# Patient Record
Sex: FEMALE | Race: WHITE | NOT HISPANIC OR LATINO | Employment: FULL TIME | URBAN - METROPOLITAN AREA
[De-identification: names, ages, dates, MRNs, and addresses within clinical notes are randomized per-mention and may not be internally consistent; named-entity substitution may affect disease eponyms.]

---

## 2020-12-03 ENCOUNTER — LAB (OUTPATIENT)
Dept: LAB | Facility: HOSPITAL | Age: 35
End: 2020-12-03

## 2020-12-03 ENCOUNTER — OFFICE VISIT (OUTPATIENT)
Dept: INTERNAL MEDICINE | Facility: CLINIC | Age: 35
End: 2020-12-03

## 2020-12-03 VITALS
RESPIRATION RATE: 16 BRPM | WEIGHT: 134 LBS | OXYGEN SATURATION: 98 % | TEMPERATURE: 97.5 F | DIASTOLIC BLOOD PRESSURE: 68 MMHG | HEIGHT: 63 IN | SYSTOLIC BLOOD PRESSURE: 102 MMHG | BODY MASS INDEX: 23.74 KG/M2 | HEART RATE: 96 BPM

## 2020-12-03 DIAGNOSIS — R53.82 CHRONIC FATIGUE: ICD-10-CM

## 2020-12-03 DIAGNOSIS — F32.A ANXIETY AND DEPRESSION: Primary | ICD-10-CM

## 2020-12-03 DIAGNOSIS — Z11.59 NEED FOR HEPATITIS C SCREENING TEST: ICD-10-CM

## 2020-12-03 DIAGNOSIS — F41.9 ANXIETY AND DEPRESSION: Primary | ICD-10-CM

## 2020-12-03 LAB
BASOPHILS # BLD AUTO: 0.03 10*3/MM3 (ref 0–0.2)
BASOPHILS NFR BLD AUTO: 0.9 % (ref 0–1.5)
DEPRECATED RDW RBC AUTO: 39.5 FL (ref 37–54)
EOSINOPHIL # BLD AUTO: 0.07 10*3/MM3 (ref 0–0.4)
EOSINOPHIL NFR BLD AUTO: 2 % (ref 0.3–6.2)
ERYTHROCYTE [DISTWIDTH] IN BLOOD BY AUTOMATED COUNT: 12.8 % (ref 12.3–15.4)
HCT VFR BLD AUTO: 36.2 % (ref 34–46.6)
HCV AB SER DONR QL: NORMAL
HGB BLD-MCNC: 12.4 G/DL (ref 12–15.9)
IMM GRANULOCYTES # BLD AUTO: 0.01 10*3/MM3 (ref 0–0.05)
IMM GRANULOCYTES NFR BLD AUTO: 0.3 % (ref 0–0.5)
LYMPHOCYTES # BLD AUTO: 0.9 10*3/MM3 (ref 0.7–3.1)
LYMPHOCYTES NFR BLD AUTO: 26.2 % (ref 19.6–45.3)
MCH RBC QN AUTO: 29.6 PG (ref 26.6–33)
MCHC RBC AUTO-ENTMCNC: 34.3 G/DL (ref 31.5–35.7)
MCV RBC AUTO: 86.4 FL (ref 79–97)
MONOCYTES # BLD AUTO: 0.51 10*3/MM3 (ref 0.1–0.9)
MONOCYTES NFR BLD AUTO: 14.9 % (ref 5–12)
NEUTROPHILS NFR BLD AUTO: 1.91 10*3/MM3 (ref 1.7–7)
NEUTROPHILS NFR BLD AUTO: 55.7 % (ref 42.7–76)
NRBC BLD AUTO-RTO: 0 /100 WBC (ref 0–0.2)
PLATELET # BLD AUTO: 290 10*3/MM3 (ref 140–450)
PMV BLD AUTO: 9.3 FL (ref 6–12)
RBC # BLD AUTO: 4.19 10*6/MM3 (ref 3.77–5.28)
WBC # BLD AUTO: 3.43 10*3/MM3 (ref 3.4–10.8)

## 2020-12-03 PROCEDURE — 80053 COMPREHEN METABOLIC PANEL: CPT | Performed by: FAMILY MEDICINE

## 2020-12-03 PROCEDURE — 86803 HEPATITIS C AB TEST: CPT | Performed by: FAMILY MEDICINE

## 2020-12-03 PROCEDURE — 84439 ASSAY OF FREE THYROXINE: CPT

## 2020-12-03 PROCEDURE — 84443 ASSAY THYROID STIM HORMONE: CPT | Performed by: FAMILY MEDICINE

## 2020-12-03 PROCEDURE — 99204 OFFICE O/P NEW MOD 45 MIN: CPT | Performed by: FAMILY MEDICINE

## 2020-12-03 PROCEDURE — 85025 COMPLETE CBC W/AUTO DIFF WBC: CPT | Performed by: FAMILY MEDICINE

## 2020-12-03 RX ORDER — ESCITALOPRAM OXALATE 10 MG/1
10 TABLET ORAL DAILY
Qty: 60 TABLET | Refills: 1 | Status: SHIPPED | OUTPATIENT
Start: 2020-12-03 | End: 2021-01-14 | Stop reason: SDUPTHER

## 2020-12-03 NOTE — PATIENT INSTRUCTIONS
Supporting Someone With Anxiety  Anxiety disorders are mental health conditions that cause overwhelming feelings of nervousness or worry. These feelings interfere with daily activities and relationships. Anxiety disorders include:  · Generalized anxiety disorder (EAGLE).  · Social anxiety.  · Post-traumatic stress disorder (PTSD).  When a person has an anxiety disorder, his or her condition can affect others around him or her, such as friends and family members. Friends and family can help by offering support and understanding.  What do I need to know about this condition?  Anxiety is the mental and physical experience of nervousness or worry that you might feel when you think about a stressful event. Occasional anxiety is normal, but a person with an anxiety disorder becomes preoccupied with this worry. He or she may know that the anxiety is not logical, but knowing this does not relieve the discomfort that he or she feels. Anxiety disorders cause a great deal of distress and prevent someone from having a normal daily life. Someone with an anxiety disorder may:  · Experience anxiety that:  ? May or may not have a specific trigger.  ? Lasts for long periods of time.  ? Causes physical problems over time.  ? Is far more intense than normal anticipation.  ? Occurs at unpredictable times.  · Feel restless or edgy.  · Get fatigued easily.  · Have trouble focusing.  · Have muscle tension.  · Have trouble falling asleep or staying asleep.  · Be irritable and occasionally have sudden expressions of strong feelings (outbursts).  · Have worries that do not make sense to you.  What do I need to know about the treatment options?  Anxiety disorders are generally very treatable by mental health providers such as psychologists, psychiatrists, and clinical social workers. Treatment may include one or more of the following:  · Psychotherapy, also called talk therapy or counseling. Types of psychotherapy that are used to treat anxiety  include:  ? Cognitive behavioral therapy (CBT). This type of therapy teaches a person how to recognize unhealthy feelings, thoughts, and behaviors, and how to replace those feelings with positive thoughts and actions.  ? Behavior therapy that trains a person to relax and self-soothe. This also involves gradually exposing the person to the cause of the anxiety (progressive exposure therapy).  ? Biofeedback. This type of therapy focuses on trying to control certain body functions, like heart rate, to lessen the physical impact of anxiety.  ? Mindfulness-based stress reduction training. This uses education, meditation, and yoga to help a person stay focused on the present instead of living in the past or worrying about the future.  ? Acceptance and commitment therapy (ACT). This helps a person to focus on acceptance, rather than trying to control every situation.  ? Family therapy. This treatment helps family members to communicate and deal with conflict in healthy ways.  · Medicine to treat anxiety and help to control certain emotions and behaviors.  · Mind-body programs. These programs encourage the person with anxiety to be involved in his or her treatment and feel empowered. Mind-body programs may include mindfulness-based stress reduction training, yoga, or vikki chi.  How can I create a safe environment?  · For certain types of anxiety, such as PTSD, you may want to:  ? Remove alcohol and prescription medicines from your loved one's home, or limit the amount of these substances in the home. This can help to prevent your loved one from abusing alcohol and prescription medicines.  ? Remove or lock up guns and other weapons. If you do not have a safe place to keep a gun, local law enforcement may store a gun for you.  · Make a written crisis plan. Include important phone numbers, such as the local crisis intervention team. Make sure that:  ? The person with anxiety knows about this plan and agrees with it.  ? Everyone  "who has regular contact with the person knows about the plan and knows what to do in an emergency.  ? The written plan is easily accessible and can be quickly put into action.  How should I care for myself?  It is important to find ways to care for your body, mind, and well-being while supporting someone with anxiety.  · Try to maintain your normal routines. This can help you remember that your life is about more than your loved one's condition.  · Understand what your limits are. Say \"no\" to requests or events that lead to a schedule that is too busy.  · Make time for activities that help you relax, and try to not feel guilty about taking time for yourself.  · Spend time with friends and family.  · Consider trying meditation and deep breathing exercises to lower your stress. Attend some mind-body classes by yourself or with your loved one.  · Get plenty of sleep.  · Exercise, even if it is just taking a short walk a few times a week.  · If you are struggling emotionally with guilt, fear, or anger, consider working with a therapist.  What are some signs that the condition is getting worse?  Signs that your loved one's condition may be getting worse include:  · Dramatic mood swings.  · Staying away from activities that he or she used to enjoy.  · Drinking more alcohol than normal.  · Either seeming tearful or seeming to lack emotion.  · Talking about \"not feeling right.\"  · Staying away from others (isolating himself or herself).  Where to find support  Talk about the condition  Good communication is the key to supporting your friend or family member. Here are a few things to keep in mind:  · Be careful about too much prodding. Try not to overdo reminders to an adult friend or family member about things like taking medicines. Ask how your loved one prefers that you help.  · Ask questions and then listen to your loved one's response. Be available if your friend or family member wants to talk, but give your loved one " space if he or she does not feel like talking.  · Never ignore comments about suicide, and do not try to avoid the subject of suicide. Talking about suicide will not make your loved one want to act on it. You or your loved one can reach out 24 hours a day to get free, private support (on the phone or a live online chat) from a suicide crisis helpline, such as the National Suicide Prevention Lifeline at 1-938.693.6220.  · Be encouraging and offer emotional support. This can help to lower stress. Even saying something simple to comfort your loved one may help.  · If your loved one is open to it, go with him or her to visits with a counselor or health care provider. Get suggestions directly from your loved one's care providers about when to get help if you are concerned about behavior changes. Privacy laws limit how much a person's health care provider can share with you without your loved one's permission, but if you feel that a situation is an emergency, do not wait to call a health care provider or emergency services.  Find support and resources  · Consider joining self-help and support groups, not only for your friend or family member, but also for yourself. People in these peer and family support groups understand what you and your loved one are going through. They can help you feel a sense of hope and connect you with local resources to help you learn more.  ? You may also consider family therapy.  General support  · Make an effort to learn all you can about your loved one's form of anxiety.  · Include your loved one in activities. Invite him or her to go for walks and outings.  · Help your loved one follow his or her treatment plan as directed by health care providers. This could mean driving him or her to therapy sessions or suggesting ways to cope with stress.  · Remember that your support really matters. Social support is a huge benefit for someone who is coping with anxiety.  Where to find more  information  A health care provider may be able to recommend mental health resources that are available online or over the phone. You could start with:  · Government sites such as the Substance Abuse and Mental Health Services Administration (SAMHSA): www.samhsa.gov  · National mental health organizations such as the National [place] [employer] Mental Illness (CASSIDY): www.cassidy.org  Get help right away if:  · Your loved one expresses thoughts about harming himself or herself or others.  · Your loved one's behavior becomes hard to predict (erratic).  · Your loved one shows behavior that does not make sense with the current time, place, or circumstances. These behaviors may include seeing, feeling, tasting, or hearing things that are not real (hallucinations) or having flashbacks.  If you ever feel like your loved one may hurt himself or herself or others, or may have thoughts about taking his or her own life, get help right away. You can go to your nearest emergency department or call:  · Your local emergency services (911 in the [place]  · A suicide crisis helpline, such as the National Suicide Prevention Lifeline at 1-347.336.5298. This is open 24 hours a day.  Summary  · People with anxiety disorders experience overwhelming feelings of nervousness or worry. Friends and family can help by offering support and understanding.  · Anxiety disorders are generally very treatable by mental health providers. They can be treated with psychotherapy (also known as talk therapy), behavior therapy, medicine, and mind-body programs.  · Be compassionate and listen to your loved one. Be available if your friend or family member wants to talk, but give your loved one space if he or she does not feel like talking.  · Find ways to care for your own body, mind, and well-being while supporting someone with anxiety. Try to maintain your normal routines and make time to do things that you enjoy.  This information is not intended to replace advice  given to you by your health care provider. Make sure you discuss any questions you have with your health care provider.  Document Revised: 04/09/2020 Document Reviewed: 05/01/2018  Elsevier Patient Education © 2020 [employer]

## 2020-12-03 NOTE — PROGRESS NOTES
Subjective     Aubree Reese is a 35 y.o. female.     Chief Complaint   Patient presents with   • Establish Care     has been off of prozac since August due to insurance and recent move     Recently moved from [facility]  Visit Type: initial  Onset of symptoms: more than 5 years ago  Progression since onset: waxing and waning  Patient presents with the following symptoms: choking sensation, decreased concentration, depressed mood, excessive worry, fatigue, feelings of worthlessness, hyperventilation, irritability, malaise, memory impairment, muscle tension, nervousness/anxiety, obsessions, palpitations, panic, restlessness and weight gain.  Patient is not experiencing: confusion, feelings of hopelessness, shortness of breath, suicidal ideas, suicidal planning and thoughts of death.  Frequency of symptoms: most days   Aggravated by: nothing  Sleep quality: fair  Nighttime awakenings: occasional  Patient has a history of: anxiety/panic attacks, depression and mental illness  No history of: hyperthyroidism, suicide attempt and substance abuse  Treatment tried: SSRI  Compliance with treatment: variable         OCD , checking things many times , washing hands   Dose not like driving     She was on Prozac for 1 year , stopped taking it due to insurance and [place]   Was on Zoloft before , not helped     Likes to eat Sweets and desert, she gained many Ibs since her moving     The following portions of the patient's history were reviewed and updated as appropriate: allergies, current medications, past family history, past medical history, past social history, past surgical history and problem list.    Review of Systems   Constitutional: Positive for fatigue, irritability and unexpected weight gain. Negative for activity change, appetite change, chills and fever.   HENT: Negative for congestion, ear discharge, ear pain, rhinorrhea, sinus pressure, sneezing, sore throat, swollen glands and trouble  swallowing.    Eyes: Negative for blurred vision, double vision, discharge and visual disturbance.   Respiratory: Positive for choking. Negative for apnea, cough, chest tightness, shortness of breath, wheezing and stridor.    Cardiovascular: Positive for palpitations. Negative for chest pain and leg swelling.   Gastrointestinal: Negative for abdominal distention, abdominal pain, nausea and vomiting.   Genitourinary: Negative for decreased urine volume, difficulty urinating, dysuria, urinary incontinence, vaginal bleeding, vaginal discharge and vaginal pain.   Musculoskeletal: Negative for gait problem, joint swelling, myalgias, neck pain and neck stiffness.   Skin: Negative for color change, pallor, rash, skin lesions and bruise.   Neurological: Negative for dizziness, tremors, seizures, syncope, speech difficulty, weakness, headache and confusion.   Hematological: Does not bruise/bleed easily.   Psychiatric/Behavioral: Positive for decreased concentration, dysphoric mood, depressed mood and stress. Negative for agitation, behavioral problems, sleep disturbance, substance abuse and suicidal ideas. The patient is nervous/anxious.    All other systems reviewed and are negative.      Vitals:    12/03/20 1115   BP: 102/68   Pulse: 96   Resp: 16   Temp: 97.5 °F (36.4 °C)   SpO2: 98%           12/03/20  1115   Weight: 60.8 kg (134 lb)         Body mass index is 23.74 kg/m².      Current Outpatient Medications   Medication Sig Dispense Refill   • escitalopram (Lexapro) 10 MG tablet Take 1 tablet by mouth Daily. 60 tablet 1     No current facility-administered medications for this visit.                 Objective   Physical Exam  Vitals signs and nursing note reviewed.   Constitutional:       General: She is not in acute distress.     Appearance: Normal appearance. She is well-developed. She is not ill-appearing, toxic-appearing or diaphoretic.   HENT:      Head: Normocephalic.      Mouth/Throat:      Mouth: Mucous  membranes are moist.      Pharynx: Oropharynx is clear.   Eyes:      Conjunctiva/sclera: Conjunctivae normal.   Neck:      Musculoskeletal: Neck supple.      Thyroid: No thyromegaly.      Comments: No enlarged thyroid    Cardiovascular:      Rate and Rhythm: Normal rate and regular rhythm.      Heart sounds: Normal heart sounds. No murmur. No friction rub. No gallop.    Pulmonary:      Effort: Pulmonary effort is normal. No respiratory distress.      Breath sounds: Normal breath sounds. No stridor. No wheezing, rhonchi or rales.   Abdominal:      General: Bowel sounds are normal. There is no distension.      Palpations: Abdomen is soft. There is no mass.      Tenderness: There is no abdominal tenderness. There is no guarding or rebound.      Hernia: No hernia is present.   Musculoskeletal: Normal range of motion.   Skin:     General: Skin is warm.      Coloration: Skin is not jaundiced or pale.      Findings: No bruising, erythema, lesion or rash.   Neurological:      Mental Status: She is alert and oriented to person, place, and time.      Motor: No abnormal muscle tone.   Psychiatric:         Mood and Affect: Mood normal.         Behavior: Behavior normal.         Thought Content: Thought content normal.         Judgment: Judgment normal.           Assessment/Plan   Diagnoses and all orders for this visit:    1. Anxiety and depression (Primary);  - Will start on;  -     escitalopram (Lexapro) 10 MG tablet; Take 1 tablet by mouth Daily.  Dispense: 60 tablet; Refill: 1  -     Ambulatory Referral to Psychology    2. Chronic fatigue  -     CBC & Differential  -     Comprehensive Metabolic Panel; Future  -     TSH Rfx On Abnormal To Free T4; Future    3. Need for hepatitis C screening test  -     Hepatitis C Antibody      I have fully discussed the nature of the medical condition(s) risks, complications, management, safe and proper use of medications.   I have discussed the SIDE EFFECT OF MEDICATION and importance TO  report any side effect , the patient expressed good understanding.  Encouraged medication compliance and the importance of keeping scheduled follow up appointments with me and any other providers.    Patient instructed to follow up with our office for results on any labs/imaging ordered during this visit.    Home care discussed  Screening for Depression done , please see [facility] PHQ 2/9  All questions answered  Patient verbalizes understanding and agrees to treatment plan.     Follow up: Return in about 6 weeks (around 1/14/2021) for follow up on current illness.

## 2020-12-04 LAB
ALBUMIN SERPL-MCNC: 4.6 G/DL (ref 3.5–5.2)
ALBUMIN/GLOB SERPL: 1.5 G/DL
ALP SERPL-CCNC: 77 U/L (ref 39–117)
ALT SERPL W P-5'-P-CCNC: 19 U/L (ref 1–33)
ANION GAP SERPL CALCULATED.3IONS-SCNC: 10.5 MMOL/L (ref 5–15)
AST SERPL-CCNC: 20 U/L (ref 1–32)
BILIRUB SERPL-MCNC: 0.2 MG/DL (ref 0–1.2)
BUN SERPL-MCNC: 10 MG/DL (ref 6–20)
BUN/CREAT SERPL: 15.6 (ref 7–25)
CALCIUM SPEC-SCNC: 8.9 MG/DL (ref 8.6–10.5)
CHLORIDE SERPL-SCNC: 104 MMOL/L (ref 98–107)
CO2 SERPL-SCNC: 24.5 MMOL/L (ref 22–29)
CREAT SERPL-MCNC: 0.64 MG/DL (ref 0.57–1)
GFR SERPL CREATININE-BSD FRML MDRD: 106 ML/MIN/1.73
GLOBULIN UR ELPH-MCNC: 3.1 GM/DL
GLUCOSE SERPL-MCNC: 62 MG/DL (ref 65–99)
POTASSIUM SERPL-SCNC: 4 MMOL/L (ref 3.5–5.2)
PROT SERPL-MCNC: 7.7 G/DL (ref 6–8.5)
SODIUM SERPL-SCNC: 139 MMOL/L (ref 136–145)
T4 FREE SERPL-MCNC: 0.74 NG/DL (ref 0.93–1.7)
TSH SERPL DL<=0.05 MIU/L-ACNC: 8.05 UIU/ML (ref 0.27–4.2)

## 2020-12-07 ENCOUNTER — TELEPHONE (OUTPATIENT)
Dept: INTERNAL MEDICINE | Facility: CLINIC | Age: 35
End: 2020-12-07

## 2020-12-07 PROCEDURE — U0004 COV-19 TEST NON-CDC HGH THRU: HCPCS | Performed by: NURSE PRACTITIONER

## 2020-12-07 NOTE — TELEPHONE ENCOUNTER
PATIENT CALLED AND STATED SHE HAD SEEN HER RESULTS ON MYCHART.  SHE WANTS TO KNOW IF THERE'S SOMETHING SHE NEEDS TO DO SINCE SEVERAL WERE ABNORMAL.    PLEASE CONTACT PATIENT TO ADVISE.    CALLBACK:  [phone]

## 2020-12-07 NOTE — TELEPHONE ENCOUNTER
She has abnormal thyroid test , needs appoit to discuss result and treatment   She can make video call visit     Ayah Stoll MD

## 2020-12-08 ENCOUNTER — TELEPHONE (OUTPATIENT)
Dept: INTERNAL MEDICINE | Facility: CLINIC | Age: 35
End: 2020-12-08

## 2020-12-08 ENCOUNTER — TELEMEDICINE (OUTPATIENT)
Dept: INTERNAL MEDICINE | Facility: CLINIC | Age: 35
End: 2020-12-08

## 2020-12-08 DIAGNOSIS — E03.9 ACQUIRED HYPOTHYROIDISM: Primary | ICD-10-CM

## 2020-12-08 DIAGNOSIS — F41.9 ANXIETY AND DEPRESSION: ICD-10-CM

## 2020-12-08 DIAGNOSIS — F32.A ANXIETY AND DEPRESSION: ICD-10-CM

## 2020-12-08 PROCEDURE — 99214 OFFICE O/P EST MOD 30 MIN: CPT | Performed by: FAMILY MEDICINE

## 2020-12-08 RX ORDER — LEVOTHYROXINE SODIUM 0.03 MG/1
25 TABLET ORAL DAILY
Qty: 60 TABLET | Refills: 0 | Status: SHIPPED | OUTPATIENT
Start: 2020-12-08 | End: 2021-01-06 | Stop reason: SDUPTHER

## 2020-12-08 NOTE — TELEPHONE ENCOUNTER
----- Message from Ayah Stoll MD sent at 12/6/2020 10:27 PM EST -----  Please call for appointment to discuss labs results

## 2020-12-10 ENCOUNTER — TELEPHONE (OUTPATIENT)
Dept: URGENT CARE | Facility: CLINIC | Age: 35
End: 2020-12-10

## 2020-12-10 PROCEDURE — U0004 COV-19 TEST NON-CDC HGH THRU: HCPCS | Performed by: NURSE PRACTITIONER

## 2020-12-10 NOTE — TELEPHONE ENCOUNTER
[facility] called and stated patients specimen leaked during transit and cannot be used and the patient will need be re-tested. I called and spoke with patient to inform her she will need to come back to our facility but she will not have to have a complete visit. I advised patient to call when she arrives. Pt voiced understanding.

## 2020-12-11 ENCOUNTER — TELEPHONE (OUTPATIENT)
Dept: INTERNAL MEDICINE | Facility: CLINIC | Age: 35
End: 2020-12-11

## 2020-12-11 NOTE — TELEPHONE ENCOUNTER
SPOKE TO PATIENT REGARDING HER BEHAVIORAL HEALTH REFERRAL. PATIENT STATES THAT SHE HAS TRIED THERAPY ON DIFFERENT OCCASIONS AND IT IS JUST NOT FOR HER. SHE WOULD LIKE TO DECLINE THIS REFERRAL AT THIS TIME. I AM GOING TO GO ON AND SEND HER A LIST OF BEHAVIORAL HEALTH SPECIALIST JUST IN CASE SHE NEEDS THEM. I HAVE ALSO ASKED PATIENT TO CALL ME BACK IF SHE CHANGES HER MIND AND I WILL BE HAPPY TO HELP HER GET SCHEDULED EVEN IF IT IS TELEHEALTH.

## 2021-01-06 DIAGNOSIS — E03.9 ACQUIRED HYPOTHYROIDISM: ICD-10-CM

## 2021-01-06 RX ORDER — LEVOTHYROXINE SODIUM 0.03 MG/1
25 TABLET ORAL DAILY
Qty: 60 TABLET | Refills: 0 | Status: SHIPPED | OUTPATIENT
Start: 2021-01-06 | End: 2021-01-14 | Stop reason: SDUPTHER

## 2021-01-14 ENCOUNTER — OFFICE VISIT (OUTPATIENT)
Dept: INTERNAL MEDICINE | Facility: CLINIC | Age: 36
End: 2021-01-14

## 2021-01-14 VITALS
WEIGHT: 186.2 LBS | SYSTOLIC BLOOD PRESSURE: 110 MMHG | BODY MASS INDEX: 32.99 KG/M2 | HEIGHT: 63 IN | DIASTOLIC BLOOD PRESSURE: 78 MMHG | TEMPERATURE: 97.7 F

## 2021-01-14 DIAGNOSIS — F32.A ANXIETY AND DEPRESSION: ICD-10-CM

## 2021-01-14 DIAGNOSIS — F41.9 ANXIETY AND DEPRESSION: ICD-10-CM

## 2021-01-14 DIAGNOSIS — E03.9 ACQUIRED HYPOTHYROIDISM: ICD-10-CM

## 2021-01-14 PROCEDURE — 99214 OFFICE O/P EST MOD 30 MIN: CPT | Performed by: FAMILY MEDICINE

## 2021-01-14 RX ORDER — LEVOTHYROXINE SODIUM 0.03 MG/1
25 TABLET ORAL DAILY
Qty: 60 TABLET | Refills: 1 | Status: SHIPPED | OUTPATIENT
Start: 2021-01-14 | End: 2021-06-16 | Stop reason: SDUPTHER

## 2021-01-14 RX ORDER — ESCITALOPRAM OXALATE 10 MG/1
10 TABLET ORAL DAILY
Qty: 60 TABLET | Refills: 1 | Status: SHIPPED | OUTPATIENT
Start: 2021-01-14 | End: 2021-06-16 | Stop reason: SDUPTHER

## 2021-01-14 NOTE — PROGRESS NOTES
Subjective     Aubree Reese is a 35 y.o. female.     Chief Complaint   Patient presents with   • Follow-up     follow up on medication and medication refills       History of Present Illness       Hypothyroidism   This is a new problem dx 1 month ago   Pt feels well with current Rx, no S/E reported   Treatments tried: stable on Levothyr 25 mcg.   Suppose to get TSH check today but she runs out of her medication for few days     Anxiety with depression ; feels better , no more cries or feeling down.   No SI/SA   Request refill         The following portions of the patient's history were reviewed and updated as appropriate: allergies, current medications, past family history, past medical history, past social history, past surgical history and problem list.        Review of Systems   Cardiovascular: Negative for chest pain and leg swelling.   Endocrine: Negative for cold intolerance and heat intolerance.   Musculoskeletal: Negative for arthralgias, back pain and gait problem.   Psychiatric/Behavioral: Negative for agitation, behavioral problems, decreased concentration, sleep disturbance and depressed mood. The patient is not nervous/anxious.    All other systems reviewed and are negative.      Vitals:    01/14/21 1615   BP: 110/78   Temp: 97.7 °F (36.5 °C)           01/14/21  1615   Weight: 84.5 kg (186 lb 3.2 oz)         Body mass index is 32.99 kg/m².      Current Outpatient Medications   Medication Sig Dispense Refill   • escitalopram (Lexapro) 10 MG tablet Take 1 tablet by mouth Daily. 60 tablet 1   • levothyroxine (SYNTHROID, LEVOTHROID) 25 MCG tablet Take 1 tablet by mouth Daily. 60 tablet 1     No current facility-administered medications for this visit.                 Objective   Physical Exam  Constitutional:       General: She is not in acute distress.     Appearance: She is not ill-appearing, toxic-appearing or diaphoretic.   HENT:      Mouth/Throat:      Mouth: Mucous membranes are moist.   Neck:       Musculoskeletal: Neck supple.      Comments: No enlarged thyroid    Cardiovascular:      Rate and Rhythm: Normal rate and regular rhythm.      Heart sounds: Normal heart sounds. No murmur.   Pulmonary:      Effort: Pulmonary effort is normal. No respiratory distress.      Breath sounds: Normal breath sounds. No stridor. No wheezing or rhonchi.   Musculoskeletal:         General: No tenderness.      Right lower leg: No edema.      Left lower leg: No edema.   Skin:     General: Skin is warm.   Neurological:      Mental Status: She is alert and oriented to person, place, and time.   Psychiatric:         Mood and Affect: Mood normal.         Behavior: Behavior normal.         Thought Content: Thought content normal.         Judgment: Judgment normal.           Assessment/Plan   Diagnoses and all orders for this visit:    1. Anxiety and depression;  - Stable, continue Rx  -     escitalopram (Lexapro) 10 MG tablet; Take 1 tablet by mouth Daily.  Dispense: 60 tablet; Refill: 1    2. Acquired hypothyroidism;  - AS SHE RUN OUT OF HER MEDICATION FOR FEW DAYS , will recheck level in 6 weeks from now  -     levothyroxine (SYNTHROID, LEVOTHROID) 25 MCG tablet; Take 1 tablet by mouth Daily.  Dispense: 60 tablet; Refill: 1  -     TSH; Future          I have fully discussed the nature of the medical condition(s) risks, complications, management, safe and proper use of medications.   I have discussed the SIDE EFFECT OF MEDICATION and importance TO report any side effect , the patient expressed good understanding.  Encouraged medication compliance and the importance of keeping scheduled follow up appointments with me and any other providers.    Patient instructed to follow up with our office for results on any labs/imaging ordered during this visit.    Home care discussed  All questions answered  Patient verbalizes understanding and agrees to treatment plan.     Follow up: Return in about 6 weeks (around 2/25/2021) for follow up on  current illness, needs TSH check few days before the appoin.

## 2021-06-16 DIAGNOSIS — E03.9 ACQUIRED HYPOTHYROIDISM: ICD-10-CM

## 2021-06-16 DIAGNOSIS — F32.A ANXIETY AND DEPRESSION: ICD-10-CM

## 2021-06-16 DIAGNOSIS — F41.9 ANXIETY AND DEPRESSION: ICD-10-CM

## 2021-06-16 RX ORDER — LEVOTHYROXINE SODIUM 0.03 MG/1
25 TABLET ORAL DAILY
Qty: 30 TABLET | Refills: 0 | Status: SHIPPED | OUTPATIENT
Start: 2021-06-16 | End: 2021-06-25 | Stop reason: SDUPTHER

## 2021-06-16 RX ORDER — ESCITALOPRAM OXALATE 10 MG/1
10 TABLET ORAL DAILY
Qty: 30 TABLET | Refills: 0 | Status: SHIPPED | OUTPATIENT
Start: 2021-06-16 | End: 2021-06-25 | Stop reason: SDUPTHER

## 2021-06-16 NOTE — TELEPHONE ENCOUNTER
Refill request levothyroxine 25mcg and escitalopram 10mg   Last refill 1/14/21  Last visit 1/14/21  No return visit

## 2021-06-25 DIAGNOSIS — F41.9 ANXIETY AND DEPRESSION: ICD-10-CM

## 2021-06-25 DIAGNOSIS — F32.A ANXIETY AND DEPRESSION: ICD-10-CM

## 2021-06-25 DIAGNOSIS — E03.9 ACQUIRED HYPOTHYROIDISM: ICD-10-CM

## 2021-06-25 NOTE — TELEPHONE ENCOUNTER
Refill request levothyroxine 25mcg, escitalopram 10mg   Last refill 6/16/21  Last visit 1/14/21  No return visit

## 2021-06-28 RX ORDER — ESCITALOPRAM OXALATE 10 MG/1
10 TABLET ORAL DAILY
Qty: 30 TABLET | Refills: 0 | Status: SHIPPED | OUTPATIENT
Start: 2021-06-28 | End: 2021-07-09 | Stop reason: SDUPTHER

## 2021-06-28 RX ORDER — LEVOTHYROXINE SODIUM 0.03 MG/1
25 TABLET ORAL DAILY
Qty: 30 TABLET | Refills: 0 | Status: SHIPPED | OUTPATIENT
Start: 2021-06-28 | End: 2021-07-08 | Stop reason: SDUPTHER

## 2021-07-01 ENCOUNTER — TELEPHONE (OUTPATIENT)
Dept: INTERNAL MEDICINE | Facility: CLINIC | Age: 36
End: 2021-07-01

## 2021-07-01 NOTE — TELEPHONE ENCOUNTER
Called pt to inform medication was refilled at the same could not leave vm at this time.Called pharmacy and they stated both prescriptions were ready to be picked up. I sent a [employer] message to pt to inform.

## 2021-07-01 NOTE — TELEPHONE ENCOUNTER
----- Message from Aubree Reese sent at 7/1/2021 11:13 AM EDT -----  Regarding: Prescription Question  Contact: [phone]  Thank you. Lexapro was not refilled . But levothyroxine did. Can you please authorize refill for Lexapro? Thank you!   Aubree Reese  [phone]

## 2021-07-08 DIAGNOSIS — E03.9 ACQUIRED HYPOTHYROIDISM: ICD-10-CM

## 2021-07-08 RX ORDER — LEVOTHYROXINE SODIUM 0.03 MG/1
25 TABLET ORAL DAILY
Qty: 15 TABLET | Refills: 0 | Status: SHIPPED | OUTPATIENT
Start: 2021-07-08

## 2021-07-09 DIAGNOSIS — F32.A ANXIETY AND DEPRESSION: ICD-10-CM

## 2021-07-09 DIAGNOSIS — F41.9 ANXIETY AND DEPRESSION: ICD-10-CM

## 2021-07-09 NOTE — TELEPHONE ENCOUNTER
Sent my chart message to have appt sched for refills     Refill request escitalopram 10mg   Last refill 6/28/21  Last visit 1/14/21  No return visit

## 2021-07-11 RX ORDER — ESCITALOPRAM OXALATE 10 MG/1
10 TABLET ORAL DAILY
Qty: 30 TABLET | Refills: 0 | Status: SHIPPED | OUTPATIENT
Start: 2021-07-11

## 2023-02-16 NOTE — PROGRESS NOTES
Radha Reese is a 35 y.o. female.     History of Present Illness     Video Visit was done today because of COVID-19.  patient has consented to receive care via Video Visit   Here for lab results discussion ;    Last visit, pt was for depression , started on Lexapro , doing well with meidcation , no S/E reported.  Blood work done showed elevated TSH and low T4   No h/o thyroid dis, she dose feel cold with dry skin and hair, chronic fatigue , wt gain , she has constipation as well.    Lab Results   Component Value Date    TSH 8.050 (H) 12/03/2020           Current Outpatient Medications on File Prior to Visit   Medication Sig Dispense Refill   • escitalopram (Lexapro) 10 MG tablet Take 1 tablet by mouth Daily. 60 tablet 1     No current facility-administered medications on file prior to visit.        The following portions of the patient's history were reviewed and updated as appropriate: allergies, current medications, past family history, past medical history, past social history, past surgical history and problem list.    Review of Systems   Constitutional: Positive for unexpected weight gain. Negative for activity change, appetite change, chills and fever.   HENT: Negative for congestion, ear discharge, ear pain, rhinorrhea, sinus pressure, sneezing, sore throat, swollen glands and trouble swallowing.    Eyes: Negative for blurred vision, double vision, discharge and visual disturbance.   Respiratory: Negative for apnea, cough, chest tightness, shortness of breath, wheezing and stridor.    Cardiovascular: Negative for chest pain, palpitations and leg swelling.   Gastrointestinal: Positive for constipation. Negative for abdominal distention, abdominal pain, diarrhea, nausea and vomiting.   Endocrine: Positive for cold intolerance.   Genitourinary: Negative for decreased urine volume, difficulty urinating and dysuria.   Musculoskeletal: Negative for gait problem, joint swelling, myalgias, neck pain and  neck stiffness.   Skin: Positive for dry skin. Negative for color change, pallor, rash, skin lesions and bruise.   Neurological: Negative for dizziness, tremors, seizures, syncope, speech difficulty, weakness, headache and confusion.   Hematological: Does not bruise/bleed easily.   Psychiatric/Behavioral: Negative for agitation, behavioral problems, decreased concentration, sleep disturbance, suicidal ideas, depressed mood and stress.   All other systems reviewed and are negative.      Objective   LMP  (LMP Unknown)   Physical Exam  Constitutional:       Appearance: She is obese.   Neurological:      Mental Status: She is alert and oriented to person, place, and time.   Psychiatric:         Mood and Affect: Mood normal.         Behavior: Behavior normal.         Thought Content: Thought content normal.         Judgment: Judgment normal.           Assessment/Plan   Diagnoses and all orders for this visit:    1. Acquired hypothyroidism (Primary);  - Newly dx, will start on;  -     levothyroxine (SYNTHROID, LEVOTHROID) 25 MCG tablet; Take 1 tablet by mouth Daily.  Dispense: 60 tablet; Refill: 0  - Recheck level in 6 weeks     2. Anxiety and depression;  - Doing well on Rx, continue     I have fully discussed the nature of the medical condition(s) risks, complications, management, safe and proper use of medications.   I have discussed the SIDE EFFECT OF MEDICATION and importance TO report any side effect , the patient expressed good understanding.  Encouraged medication compliance and the importance of keeping scheduled follow up appointments with me and any other providers.    Patient instructed to follow up with our office for results on any labs/imaging ordered during this visit.    Home care discussed  Screening for Depression done , please see [facility] PHQ 2/9  All questions answered  Patient verbalizes understanding and agrees to treatment plan.                            Klisyri Counseling:  I discussed with the patient the risks of Klisyri including but not limited to erythema, scaling, itching, weeping, crusting, and pain.